# Patient Record
Sex: FEMALE | NOT HISPANIC OR LATINO | URBAN - METROPOLITAN AREA
[De-identification: names, ages, dates, MRNs, and addresses within clinical notes are randomized per-mention and may not be internally consistent; named-entity substitution may affect disease eponyms.]

---

## 2018-06-14 ENCOUNTER — OFFICE VISIT CONVERTED (OUTPATIENT)
Dept: OTHER | Facility: HOSPITAL | Age: 48
End: 2018-06-14
Attending: INTERNAL MEDICINE

## 2019-06-13 ENCOUNTER — CONVERSION ENCOUNTER (OUTPATIENT)
Dept: OTHER | Facility: HOSPITAL | Age: 49
End: 2019-06-13

## 2020-05-14 ENCOUNTER — OFFICE VISIT CONVERTED (OUTPATIENT)
Dept: OTHER | Facility: HOSPITAL | Age: 50
End: 2020-05-14
Attending: INTERNAL MEDICINE

## 2021-05-28 VITALS
SYSTOLIC BLOOD PRESSURE: 122 MMHG | WEIGHT: 242.7 LBS | DIASTOLIC BLOOD PRESSURE: 76 MMHG | OXYGEN SATURATION: 96 % | RESPIRATION RATE: 20 BRPM | HEART RATE: 88 BPM | HEART RATE: 66 BPM | HEART RATE: 94 BPM | TEMPERATURE: 97.3 F | WEIGHT: 252.19 LBS | BODY MASS INDEX: 46.25 KG/M2 | DIASTOLIC BLOOD PRESSURE: 74 MMHG | TEMPERATURE: 98.3 F | BODY MASS INDEX: 46.41 KG/M2 | OXYGEN SATURATION: 98 % | TEMPERATURE: 98.9 F | SYSTOLIC BLOOD PRESSURE: 138 MMHG | RESPIRATION RATE: 18 BRPM | SYSTOLIC BLOOD PRESSURE: 138 MMHG | OXYGEN SATURATION: 99 % | WEIGHT: 251.3 LBS | BODY MASS INDEX: 44.66 KG/M2 | DIASTOLIC BLOOD PRESSURE: 79 MMHG | HEIGHT: 62 IN | HEIGHT: 62 IN | HEIGHT: 62 IN

## 2021-05-28 NOTE — PROGRESS NOTES
Patient: CHEPE KIDD     Acct: [id number]     Report: #[id number]  UNIT #: [id number]     : 1970    Encounter Date:2020  PRIMARY CARE:   ***Signed***  --------------------------------------------------------------------------------------------------------------------  DATE: 20      Primary Care Provider      Primary Care Provider:  CURLY TOUSSAINT            Referring Physician      Referring Provider:  CURLY TOUSSAINT            Chief Complaint      FU Leukocytosis            Subjective      49-year-old white female is followed up in this clinic every 6-month for     leukocytosis.  Initial work-up on this patient revealed no possible etiology.      Decision was to observe and  bone marrow studies was not performed.  Patient     gives a history of Hashimoto's thyroiditis that is followed up with Dr. Curly Toussaint.            She offers no complaints.            Past Med/Surg History            Past Med/Surg History:   Hypertension             No Diabetes Mellitus             No Heart Disease             No Blood Clots             No Cancer             Lung Disease (Asthma)             No Kidney Disease             No Other            Social History      Social History:  No Tobacco Use, No Alcohol Use, No Recreational Drug use, No     Other            Allergies      Coded Allergies:             Metformin (Verified  Allergy, Unknown, 20)           Vioxx (Verified  Allergy, Unknown, 20)            Medications      Medications    Last Reconciled on 20 18:18 by BESS RODRIGUES MD      Magnesium Amino Acid Chelate (Magnesium*) 100 Mg Tablet      200 MG PO QDAY, TAB         Reported         19       Folic Acid/Multivits-Min/Lut (Multi-Vitamin) 1 Tab Tablet      1 TAB PO QDAY, #30 TAB 0 Refills         Reported         18       Garlic (Garlic) 200 Mg Tablet      200 MG PO QDAY, TAB         Reported         18       Triamcinolone Acetonide (Nasacort) 16.9 Ml Spray      1  PUFF NARE EACH QDAY, #1 BOTTLE         Reported         6/14/18       Ferrous Sulfate (Iron Sulfate*) 325 Mg Tablet      65 MG PO QDAY, #30 TAB 0 Refills         Reported         6/14/18       Albuterol (Proair HFA) 8.5 Gm Inh      1 PUFFS INH RTQ4H PRN for SHORTNESS OF BREATH, #1 INH 0 Refills         Reported         6/14/18       Furosemide* (Lasix*) 40 Mg Tablet      40 MG PO QDAY, #30 TAB 0 Refills         Reported         6/14/18       Spironolactone (Spironolactone*) 50 Mg Tablet      50 MG PO BID, TAB         Reported         6/14/18       Cetirizine Hcl (zyrTEC) 10 Mg Tablet      10 MG PO QDAY, #30 TAB 0 Refills         Reported         6/14/18       Potassium Chloride (K-Tab*) 10 Meq Tablet.er      10 MEQ PO QDAY, TAB         Reported         6/14/18       Levothyroxine (Synthroid) 0.075 Mg      0.075 MG PO QDAY@07, #30 TAB 0 Refills         Reported         6/14/18       Montelukast Sodium (Singulair*) 10 Mg Tab      10 MG PO QDAY, #30 TAB 0 Refills         Reported         6/14/18       Losartan Potassium (Losartan*) 100 Mg Tablet      100 MG PO QDAY, #30 TAB 0 Refills         Reported         6/14/18       Budesonide/Formoterol Fumarate (Symbicort 160/4.5 Mcg) 10.2 Gm Inh      2 PUFF INH RTBID, #1 INH 0 Refills         Reported         6/14/18      Current Medications      Current Medications Reviewed 5/14/20            Pain Assessment      Pain Intensity:  0      Description:  None            Review of Systems      General:  No Anxiety; Fatigue Scale: (0), Pain Scale: (0)      HEENT:  No Dysphagia, No Hearing Changes      Respiratory:  No Cough      Cardiovascular:  No Chest Pain, No Pedal Edema      Gastrointestinal:  No Nausea; Appetite Good (Good)      Genitourinary:  No Nocturia, No Dysuria      Musculoskeletal:  No Joint Effusions      Endocrine:  No Heat Intolerance      Hematologic:  No Bleeding, No Bruising      Allergic/Immunologic:  No Hives      Psychological:  No Anxiety      Neurological:   No Headaches, No Dizziness      Skin:  No Rash            Vitals      Height 5 ft 2 in / 157.48 cm      Weight 242 lbs 11.2 oz / 110.341968 kg      BSA 2.08 m2      BMI 44.4 kg/m2      Temperature 98.9 F / 37.17 C      Pulse 94      Respirations 18      Blood Pressure 138/76      Pulse Oximetry 98%            Exam      Constitutional:  No acute distress, Conversant, Pleasant      Eyes:  Anicteric sclerae, Palpebral Conjunctivae (Pink), MARI, Other     (Hemorrhage in the lateral aspect of her right thigh)      Neck:  Supple, Full Range of Motion      Cardiovascular:  RRR; No Murmurs; Normal PMI; No Peripheral Edema      Lungs:  Clear to Ausculation, Normal Respiratory Effort      Abdomen:  Soft; No Tenderness      Chest:  Other (Symmetrical)      Lymphatic:  No Neck      Extremities:  No digital cyanosis, No digital ischemia, Normal gait, Other (No     deformity)      Neurological:  Cranial Nerve II-XII (Intact); No Focal Sensory deficits      Psychological:  Appropriate affect, Appropriate mood, Intact judgement, Alert      Skin:  Other (No dermatosis)            Lab Results      White count 14.1, hemoglobin 14.4/hematocrit 44.5, platelet count 282,000,     normal differential            Impression/Problem List      Chronic leukocytosis, mild, stable      Hypertension      Hemorrhage lateral aspect of the right eye?  Post trauma      Notes      Changed Medications      * ALBUTEROL (Proair HFA) 8.5 GM INH: 1 PUFFS INH RTQ4H PRN SHORTNESS OF BREATH       #1            Plan      Annual follow-up, CBC, sed rate, CRP.      CBC, sed rate, CRP today      Patient will try to go off Lasix and potassium if her blood pressure is good and    she does not have any edema.  Patient will check her blood pressure daily.            Patient Education:        DI for Leukocytosis            PREVENTION      Hx Influenza Vaccination:  Yes      Influenza Vaccine Declined:  No      2 or More Falls Past Year?:  No      Fall Past Year with  Injury?:  No      Encouraged to follow-up with:  PCP regarding preventative exams.      Chart initiated by      KRISTA Moise MA            Electronically signed by Saskia Machado  05/14/2020 18:18       Disclaimer: Converted document may not contain table formatting or lab diagrams. Please see [employer] for the authenticated document.

## 2021-05-28 NOTE — PROGRESS NOTES
Patient: CHEPE KIDD     Acct: [id number]     Report: #[id number]  UNIT #: [id number]     : 1970    Encounter Date:2018  PRIMARY CARE:   ***Signed***  --------------------------------------------------------------------------------------------------------------------  DATE: 18      Primary Care Provider      Primary Care Provider:  PAPO TOUSSAINT            Referring Physician      Referring Provider:  PAPO TOUSSAINT            Chief Complaint      Follow up Leukocytosis            Subjective      No evidence of cat scratches.  No fevers, no infection            Past Med/Surg History            Past Med/Surg History:   Hypertension             No Diabetes Mellitus             No Heart Disease             No Blood Clots             No Cancer             Lung Disease             No Kidney Disease             No Other            Social History      Social History:  No Tobacco Use, No Alcohol Use, No Recreational Drug use, No     Other            Allergies      Coded Allergies:             No Known Drug Allergies (Verified  Allergy, 18)            Medications      Folic Acid/Multivits-Min/Lut (Multi-Vitamin) 1 Tab Tablet      1 TAB PO QDAY, #30 TAB 0 Refills         Reported         18       Garlic (Garlic) 200 Mg Tablet      200 MG PO QDAY, TAB         Reported         18       Triamcinolone Acetonide (Nasacort) 16.9 Ml [place]      1 PUFF NARE EACH QDAY, #1 BOTTLE         Reported         18       Ferrous Sulfate (Iron Sulfate*) 325 Mg Tablet      325 MG PO QDAY, #30 TAB 0 Refills         Reported         18       Albuterol (Proair HFA*) 8.5 Gm Inh      1 PUFFS INH RTQ4H, #1 INH 0 Refills         Reported         18       Furosemide* (Lasix*) 40 Mg Tablet      40 MG PO QDAY, #30 TAB 0 Refills         Reported         18       Spironolactone (Spironolactone*) 50 Mg Tablet      50 MG PO BID, TAB         Reported         18       Cetirizine Hcl (ZyrTEC*) 10  Mg Tablet      10 MG PO QDAY, #30 TAB 0 Refills         Reported         6/14/18       Potassium Chloride (K-Tab*) 10 Meq Tablet.er      10 MEQ PO QDAY, TAB         Reported         6/14/18       Levothyroxine (Synthroid) 0.075 Mg      0.075 MG PO QDAY@07, #30 TAB 0 Refills         Reported         6/14/18       Montelukast Sodium (Singulair*) 10 Mg Tab      10 MG PO QDAY, #30 TAB 0 Refills         Reported         6/14/18       Losartan Potassium (Losartan*) 100 Mg Tablet      100 MG PO QDAY, #30 TAB 0 Refills         Reported         6/14/18       Budesonide/Formoterol Fumarate (Symbicort 160/4.5 Mcg) 10.2 Gm Inh      2 PUFF INH RTBID, #1 INH 0 Refills         Reported         6/14/18      Current Medications      Current Medications Reviewed 6/14/18            Pain Assessment      Description:  None            Review of Systems      General:  No Anxiety, No Fatigue Scale:, No Pain Scale:, No Fever, No Other      HEENT:  No Dysphagia, No Hearing Changes, No Rhinorrhea, No Tinnitus, No Visual     Changes, No Nasal Congestion, No Epistaxis, No Other      Respiratory:  Cough; No Shortness of Air, No Sputum Changes; Wheezing; No     Hemoptysis, No Congestion, No Other      Cardiovascular:  No Chest Pain, No Pedal Edema, No Orthopnea, No Palpitations,     No Chest Pressure, No Dizziness, No Other      Gastrointestinal:  Appetite Good      Genitourinary:  No Nocturia, No Dysuria, No Other      Musculoskeletal:  No Joint Effusions, No Joint Tenderness, No Joint Stiffness,     No Myalgias, No Aches, No Pains, No Other      Endocrine:  No Heat Intolerance, No Cold Intolerance, No Fatigue, No Blood Sugar    Control, No Other      Hematologic:  No Bleeding, No Bruising, No Swollen Glands, No Other      Allergic/Immunologic:  No Hives, No Throat closing off, No Nasal drip, No Itchy     eyes, No Hay fever, No Other      Psychological:  No Anxiety, No Depression, No Other      Neurological:  No Headaches, No Dizziness, No  Weakness, No Numbness, No Other      Skin:  No Rash, No Open Wounds, No Edema, No Other            Vitals      Height 5 ft 2 in / 157.48 cm      Weight 252 lbs 3 oz / 114.888643 kg      BSA 2.31 m2      BMI 46.1 kg/m2      Temperature 97.3 F / 36.28 C - Temporal      Pulse 88      Blood Pressure 122/79 Sitting, Left Arm      Pulse Oximetry 96%, room air            Exam      Constitutional:  No acute distress, Conversant, Pleasant      Eyes:  Anicteric sclerae, Palpebral Conjunctivae (pink), MARI      HENT:  Oropharynx clear; No Erythema; Buccal mucosae (pink)      Neck:  Supple, Full Range of Motion      Cardiovascular:  RRR; No Murmurs; Normal PMI; No Peripheral Edema      Lungs:  Clear to Ausculation, Normal Respiratory Effort      Abdomen:  Soft, NABS      Chest:  Other (symmetrical and equal)      Lymphatic:  No Neck      Extremities:  No digital cyanosis, No digital ischemia, Normal gait, Other (no     deformity no limitation range of motion)      Neurological:  Cranial Nerve II-XII; No Focal Sensory deficits      Psychological:  Appropriate affect, Appropriate mood, Intact judgement, Alert      Skin:  Other (no dermatosis)            Lab Results      White count 14.1, hemoglobin 14.6, hematocrit 44.3, platelet count 363      Sedimentation rate normal            Impression/Problem List      Chronic leukocytosis, mild, stable      Hypertension      Notes      New Medications      * Budesonide/Formoterol Fumarate (Symbicort 160/4.5 Mcg) 10.2 GM INH: 2 PUFF INH      RTBID #1      * Losartan Potassium (Losartan*) 100 MG TABLET: 100 MG PO QDAY #30      * Montelukast Sodium (Singulair*) 10 MG TAB: 10 MG PO QDAY #30      * Levothyroxine (Synthroid) 0.075 M.075 MG PO QDAY@07 #30      * POTASSIUM CHLORIDE (K-Tab*) 10 MEQ TABLET.ER: 10 MEQ PO QDAY      * Cetirizine Hcl (ZyrTEC*) 10 MG TABLET: 10 MG PO QDAY #30      * Spironolactone (Spironolactone*) 50 MG TABLET: 50 MG PO BID      * Furosemide* (Lasix*) 40 MG  TABLET: 40 MG PO QDAY #30      * ALBUTEROL (Proair HFA*) 8.5 GM INH: 1 PUFFS INH RTQ4H #1      * FERROUS SULFATE (Iron Sulfate*) 325 MG TABLET: 325 MG PO QDAY #30      * Triamcinolone Acetonide (Nasacort) 16.9 ML SPRAY: 1 PUFF NARE EACH QDAY #1      * Garlic 200 MG TABLET: 200 MG PO QDAY         Instructions: 1 TAB      * FOLIC ACID/MULTIVITS-MIN/LUT (Multi-Vitamin) 1 TAB TABLET: 1 TAB PO QDAY #30            Plan      Annual follow-up, earlier if needed            Patient Education:        DI for Leukocytosis            PREVENTION      Hx Influenza Vaccination:  Yes      Date Influenza Vaccine Given:  Sep 18, 2017      Influenza Vaccine Declined:  No      2 or More Falls Past Year?:  No      Fall Past Year with Injury?:  No      Hx Pneumococcal Vaccination:  No      Encouraged to follow-up with:  PCP regarding preventative exams.      Chart initiated by      Amanda Sanford MA                 Disclaimer: Converted document may not contain table formatting or lab diagrams. Please see [employer] for the authenticated document.